# Patient Record
(demographics unavailable — no encounter records)

---

## 2017-03-20 NOTE — EMERGENCY ROOM VISIT NOTE
History


First contact with patient:  14:02


Chief Complaint:  BACK PAIN


Stated Complaint:  SEVERE BACK PAIN





History of Present Illness


The patient is a 18 year old female who presents to the Emergency Room with 

complaints of back pain and fever.  The patient reports that she has had low 

back pain times one week which has worsened this morning.  The patient 

initially had burning with urination last week and the patient's mother reports 

that she called the pediatrician, who called the patient in a prescription for 

Diflucan.  The patient states that her urinary symptoms seemed to resolve, but 

a few days later she developed pain in her back and her left side.  She now 

reports the pain is in her mid back and is worse on the left side, but is on 

both sides.  She had one episode of vomiting this morning.  The patient reports 

that she was seen at [facility] yesterday and told that she had a urinary 

tract infection and a possible tonsil infection.  She was given a prescription 

for Augmentin and has taken one dose.  She states that she has been taking 

ibuprofen with some relief of her symptoms.  She rates her overall discomfort a 

10/10.  The patient reports that she has had fevers and chills.  She denies any 

urinary symptoms at this time.  She denies any changes in bowel movements, 

vaginal discharge, or abnormal vaginal bleeding.  She denies chance of 

pregnancy.





Review of Systems


A complete 10-point Review of Systems was discussed with the patient, with 

pertinent positives and negatives listed in the History of Present Illness. All 

remaining Review of Systems questions can be considered negative unless 

otherwise specified.





Past Medical/Surgical History


Medical Problems:


(1) No Known Active Medical Problems








Social History


Smoking Status:  Never Smoker


Housing Status:  lives with family


Occupation Status:  student





Current/Historical Medications


Scheduled


Birth Control Pills (Birth Control Pills), 1 TAB PO DAILY


Ciprofloxacin Hcl (Cipro), 500 MG PO BID





Allergies


Coded Allergies:  


     No Known Allergies (Unverified , 1/24/14)





Physical Exam


Vital Signs











  Date Time  Temp Pulse Resp B/P Pulse Ox O2 Delivery O2 Flow Rate FiO2


 


3/20/17 17:52 37.0 110  94/52 98 Room Air  


 


3/20/17 17:13 37.4 116  117/67 99 Room Air  


 


3/20/17 15:49 37.9 108  108/68 99   


 


3/20/17 14:37  109      


 


3/20/17 14:33 37.9 112 16 118/67 100 Room Air  


 


3/20/17 12:48 37.6 137 24 138/85 98 Room Air  











Physical Exam


VITALS: Vitals are noted on the nurse's note and reviewed by myself.  Vital 

signs stable.  Temperature 37.6C.


GENERAL: This is an 18-year-old female, teary-eyed and appears to be in pain, 

nondiaphoretic, well-developed well-nourished.


SKIN: Capillary reflex less than 2 seconds.


HEENT: Normocephalic.  PERRLA.  EOMI.  Nares patent.  Mucous membranes moist.  

Bilateral tonsils slightly enlarged without significant erythema.  Neck is 

supple without nuchal rigidity.


HEART: Tachycardic, regular rhythm without murmurs gallops or rubs.


LUNGS: Clear to auscultation bilaterally without wheezes, rales or rhonchi.  


ABDOMEN: Positive bowel sounds x 4.  Soft, mild tenderness of the left lower 

quadrant.  No guarding or rebound tenderness.


MUSCULOSKELETAL: Left CVA tenderness, mild right CVA tenderness.


NEURO: Patient was alert and oriented to person place and time.





Medical Decision & Procedures


Laboratory Results


3/20/17 14:05








Red Blood Count 4.19, Mean Corpuscular Volume 84.5, Mean Corpuscular Hemoglobin 

28.6, Mean Corpuscular Hemoglobin Concent 33.9, Mean Platelet Volume 12.8, 

Neutrophils (%) (Auto) 74.8, Lymphocytes (%) (Auto) 14.2, Monocytes (%) (Auto) 

10.4, Eosinophils (%) (Auto) 0.1, Basophils (%) (Auto) 0.1, Neutrophils # (Auto

) 10.31, Lymphocytes # (Auto) 1.96, Monocytes # (Auto) 1.44, Eosinophils # (Auto

) 0.01, Basophils # (Auto) 0.02





3/20/17 14:05

















Test


  3/20/17


14:05 3/20/17


15:48


 


White Blood Count


  13.79 K/uL


(4.8-10.8) 


 


 


Red Blood Count


  4.19 M/uL


(4.2-5.4) 


 


 


Hemoglobin


  12.0 g/dL


(12.0-16.0) 


 


 


Hematocrit 35.4 % (37-47)  


 


Mean Corpuscular Volume


  84.5 fL


() 


 


 


Mean Corpuscular Hemoglobin


  28.6 pg


(25-34) 


 


 


Mean Corpuscular Hemoglobin


Concent 33.9 g/dl


(32-36) 


 


 


Platelet Count


  128 K/uL


(130-400) 


 


 


Mean Platelet Volume


  12.8 fL


(7.4-10.4) 


 


 


Neutrophils (%) (Auto) 74.8 %  


 


Lymphocytes (%) (Auto) 14.2 %  


 


Monocytes (%) (Auto) 10.4 %  


 


Eosinophils (%) (Auto) 0.1 %  


 


Basophils (%) (Auto) 0.1 %  


 


Neutrophils # (Auto)


  10.31 K/uL


(1.4-6.5) 


 


 


Lymphocytes # (Auto)


  1.96 K/uL


(1.2-3.4) 


 


 


Monocytes # (Auto)


  1.44 K/uL


(0.11-0.59) 


 


 


Eosinophils # (Auto)


  0.01 K/uL


(0-0.5) 


 


 


Basophils # (Auto)


  0.02 K/uL


(0-0.2) 


 


 


RDW Standard Deviation


  40.4 fL


(36.4-46.3) 


 


 


RDW Coefficient of Variation


  13.2 %


(11.5-14.5) 


 


 


Immature Granulocyte % (Auto) 0.4 %  


 


Immature Granulocyte # (Auto)


  0.05 K/uL


(0.00-0.02) 


 


 


Platelet Estimate NORMAL  


 


Anion Gap


  12.0 mmol/L


(3-11) 


 


 


Est Creatinine Clear Calc


Drug Dose 111.6 ml/min 


  


 


 


Estimated GFR (


American) 126.7 


  


 


 


Estimated GFR (Non-


American 109.3 


  


 


 


BUN/Creatinine Ratio 9.5 (10-20)  


 


Calcium Level


  9.6 mg/dl


(8.5-10.1) 


 


 


Total Bilirubin


  0.3 mg/dl


(0.2-1) 


 


 


Aspartate Amino Transf


(AST/SGOT) 13 U/L (15-37) 


  


 


 


Alanine Aminotransferase


(ALT/SGPT) 18 U/L (12-78) 


  


 


 


Alkaline Phosphatase


  67 U/L


() 


 


 


Total Protein


  8.4 gm/dl


(6.4-8.2) 


 


 


Albumin


  3.5 gm/dl


(3.4-5.0) 


 


 


Globulin


  4.9 gm/dl


(2.5-4.0) 


 


 


Albumin/Globulin Ratio 0.7 (0.9-2)  


 


Lipase


  75 U/L


() 


 


 


Urine Color  YELLOW 


 


Urine Appearance  CLEAR (CLEAR) 


 


Urine pH  8.5 (4.5-7.5) 


 


Urine Specific Gravity


  


  1.018


(1.000-1.030)


 


Urine Protein  TRACE (NEG) 


 


Urine Glucose (UA)  NEG (NEG) 


 


Urine Ketones  1+ (NEG) 


 


Urine Occult Blood  NEG (NEG) 


 


Urine Nitrite  NEG (NEG) 


 


Urine Bilirubin  NEG (NEG) 


 


Urine Urobilinogen  NEG (NEG) 


 


Urine Leukocyte Esterase  SMALL (NEG) 


 


Urine WBC (Auto)


  


  10-30 /hpf


(0-5)


 


Urine RBC (Auto)


  


  5-10 /hpf


(0-4)


 


Urine Hyaline Casts (Auto)


  


  5-10 /lpf


(0-5)


 


Urine Epithelial Cells (Auto)  >30 /lpf (0-5) 


 


Urine Bacteria (Auto)  NEG (NEG) 


 


Urine Renal Epithelial Cells


  


  5-10 /lpf


(0-5)


 


Urine Pregnancy Test  NEG (NEG) 











Medications Administered











 Medications


  (Trade)  Dose


 Ordered  Sig/Tyree


 Route  Start Time


 Stop Time Status Last Admin


Dose Admin


 


 Sodium Chloride


  (Nss 1000ml)  1,000 ml @ 


 999 mls/hr  Q1H1M STAT


 IV  3/20/17 14:14


 3/20/17 15:14 DC 3/20/17 14:30


999 MLS/HR


 


 Ketorolac


 Tromethamine 30 mg  30 mg  STK-MED ONCE


 .ROUTE  3/20/17 14:23


 3/20/17 14:26 DC 3/20/17 14:29


15 MG


 


 Sodium Chloride


  (Nss 1000ml)  1,000 ml @ 


 999 mls/hr  Q1H1M STAT


 IV  3/20/17 15:24


 3/20/17 16:24 DC 3/20/17 15:24


999 MLS/HR


 


 Acetaminophen


  (Tylenol Tab)  1,000 mg  NOW  STAT


 PO  3/20/17 15:56


 3/20/17 15:57 DC 3/20/17 15:56


1,000 MG


 


 Ceftriaxone Sodium


  (Rocephin Inj)  1 gm  NOW  STAT


 IV  3/20/17 16:52


 3/20/17 16:54 DC 3/20/17 17:12


1 GM











Medical Decision


Differential diagnosis includes pyelonephritis, renal calculus, ovarian cyst, 

ovarian torsion, appendicitis, cholecystitis, pancreatitis, bowel obstruction, 

colitis, musculoskeletal pain, among others.





The patient was evaluated as above.  Labs were drawn and IV access was 

obtained.  Imaging studies were performed and read by radiology as above.  The 

patient was medicated as above.  The patient was reassessed multiple times 

during their stay in the emergency department and remained in stable condition.





The patient is an 18-year-old female who presents today complaining of back 

pain and fevers.  The patient recently started treatment for urinary tract 

infection.  Exam did reveal some CVA tenderness and the patient does have a low-

grade fever today.  Labs revealed a leukocytosis of 13,000.  There were no 

concerning electrolyte abnormalities.  Urine pregnancy was negative.  

Urinalysis was suggestive of infection and given the patient's symptoms I feel 

this likely represents a pyelonephritis.  I did speak with the ED pharmacist, 

who recommended switching the patient to ciprofloxacin.  She was given a dose 

of Rocephin and will be placed on a course of Cipro at home.  The patient's 

pain was well controlled with Toradol.  She was initially tachycardic, but her 

heart rate did improve after 2 L of fluids.  The patient was instructed that 

she should have follow-up within 48 hours with her primary care provider.





Based on the patient's presentation, lab results, and imaging studies, I feel 

the patient is stable for outpatient treatment.  The patient's case was 

reviewed with Dr. Quiroz, ED attending physician, who agreed with my 

assessment and treatment plan. Discharge instructions were reviewed with the 

patient.  The patient verbalized understanding of my assessment and treatment 

plan and was discharged home in good condition.





Impression





 Primary Impression:  


 Pyelonephritis





Departure Information


Dispostion


Home / Self-Care





Condition


GOOD





Prescriptions





Ciprofloxacin Hcl (CIPRO) 500 Mg Tab


500 MG PO BID for 12 Days, #24 TAB


   Prov: Alana Joens .BELLE         3/20/17





Referrals


Zofia Dumont M.D. (PCP)





Patient Instructions


My [facility]





Additional Instructions





You were prescribed ciprofloxacin to be taken twice daily for 12 days. This is 

an antibiotic. All antibiotics have the potential to cause diarrhea. Stop this 

medication and contact a medical provider if you were to develop any 

significant adverse side effects including: wheezing, shortness of breath, 

passing out, vomiting, or a diffuse rash. Always take antibiotics as directed 

and COMPLETE the ENTIRE course regardless of the improvement of your symptoms.





For pain/fever control, you can use the following over-the-counter medicines (

if >11 yo):





- Regular strength (325mg/tab) Tylenol (acetaminophen) 2 tabs every 4-6 hours 

as needed. Do not exceed 12 tablets in a 24 hour period. Avoid taking more than 

4 grams (4000 mg) of Tylenol per day. This includes any other sources of 

acetaminophen you may take on a regular basis.





- Regular strength (200 mg/tab) Advil (ibuprofen) 3-4 tabs every 6 hours as 

needed. Do not exceed a dose of 3200 mg per day.





You should have a follow-up with your pediatrician within 48 hours.





Return to the emergency department with abdominal pain, persistent vomiting, 

fevers not controlled with Tylenol or ibuprofen, worsening back pain or any 

other new/concerning symptoms.

## 2017-12-20 NOTE — EMERGENCY ROOM VISIT NOTE
History


First contact with patient:  13:50


Chief Complaint:  URINARY SYMPTOMS


Stated Complaint:  UTI, HERPS, PAIN IN CARROL, BUTT


Nursing Triage Summary:  


Recent dx of BV, UTI, and Herpes. Severe vaginal pain/itching.





History of Present Illness


The patient is a 19 year old female who presents to the Emergency Room with 

complaints of vaginal discharge and genital pain that has gotten progressively 

worse over the last week.  The patient was seen by her OB/GYN doctor last week.

  She was diagnosed with a bacterial vaginal infection.  She was put on vaginal 

suppositories.  She is unsure of the medication.  She has not seen any relief.  

She reports a white, thick discharge.  She also reports severe pain in the 

vaginal and anal area.  The patient has been sexually active with 2 partners in 

the last 2 weeks.  She is not using protection.  She also reports dysuria.  No 

flank pain.  No nausea or vomiting.  No recent fever.  The patient was seen at 

[facility] 2 days ago.  She was diagnosed with herpes and a UTI.  She was put 

on Valtrex and an oral antibiotic.  She has had minimal relief.





Review of Systems


10 system review performed and  negative unless noted in HPI or below





Past Medical/Surgical History


Medical Problems:


(1) No Known Active Medical Problems








Social History


Smoking Status:  Never Smoker


Housing Status:  lives with family


Occupation Status:  student





Current/Historical Medications


Scheduled


Metronidazole (Flagyl), 500 MG PO BID


Nitrofurantoin Monohyd Macro (Macrobid), 100 MG PO BID





Physical Exam


Vital Signs











  Date Time  Temp Pulse Resp B/P (MAP) Pulse Ox O2 Delivery O2 Flow Rate FiO2


 


12/20/17 15:45  62 16 109/64 98 Room Air  


 


12/20/17 13:41 36.7 108 22 137/82 98 Room Air  











Physical Exam


VITALS: Vitals are noted on the nurse's note and reviewed by myself.  Vital 

signs stable.


GENERAL: 19-year-old female, tearful,, in no acute distress, nondiaphoretic, 

well-developed well-nourished.


SKIN: The skin was without rashes, erythema, edema, or bruising.  


HEAD: Normocephalic atraumatic.  


NECK: Supple without nuchal rigidity.  


HEART: Regular rate and rhythm without murmurs gallops or rubs.


LUNGS: Clear to auscultation bilaterally without wheezes, rales or rhonchi.   

No accessory muscle use.


ABDOMEN: Positive bowel sounds x 4.Soft, nontender, without organomegaly. No 

guarding or rebound tenderness.


: Approximate 2 mm ulcerations noted to the labia majora and perianal area.  

Thick, white discharge noted.  Pelvic exam is limited secondary to pain.  The 

cervix was not visualized.  Vaginal rugae friable, erythematous.  No foul odor 

noted.


MUSCULOSKELETAL: No muscle atrophy, erythema, or edema noted.  Full range of 

motion in all extremities.  No tenderness to palpation.  Normal gait.  Strength 

5/5 throughout.


NEURO: Patient was alert and oriented to person place and time.  Normal 

sensation to touch. No focal neurological deficits.





Medical Decision & Procedures


Laboratory Results











Test


  12/20/17


14:50 12/20/17


15:00


 


Urine Color  DK YELLOW 


 


Urine Appearance  CLOUDY (CLEAR) 


 


Urine pH  7.5 (4.5-7.5) 


 


Urine Specific Gravity


  


  1.022


(1.000-1.030)


 


Urine Protein  NEG (NEG) 


 


Urine Glucose (UA)  NEG (NEG) 


 


Urine Ketones  TRACE (NEG) 


 


Urine Occult Blood  2+ (NEG) 


 


Urine Nitrite  NEG (NEG) 


 


Urine Bilirubin  NEG (NEG) 


 


Urine Urobilinogen  NEG (NEG) 


 


Urine Leukocyte Esterase  MODERATE (NEG) 


 


Urine WBC (Auto)


  


  10-30 /hpf


(0-5)


 


Urine RBC (Auto)  >30 /hpf (0-4) 


 


Urine Hyaline Casts (Auto)


  


  5-10 /lpf


(0-5)


 


Urine Epithelial Cells (Auto)


  


  20-30 /lpf


(0-5)


 


Urine Bacteria (Auto)  NEG (NEG) 


 


Urine Pregnancy Test  NEG (NEG) 














 Date/Time


Source Procedure


Growth Status


 


 


 12/20/17 14:50


Cervix Swab Trichomonas Preparation - Final Complete











Medications Administered











 Medications


  (Trade)  Dose


 Ordered  Sig/Tyree


 Route  Start Time


 Stop Time Status Last Admin


Dose Admin


 


 Acyclovir


  (Zovirax Tab)  400 mg  ONE  STAT


 PO  12/20/17 15:17


 12/20/17 15:18 DC 12/20/17 15:48


400 MG


 


 Metronidazole


  (Flagyl Tab)  500 mg  NOW  STAT


 PO  12/20/17 15:28


 12/20/17 15:29 DC 12/20/17 15:57


500 MG


 


 Acyclovir


  (Zovirax Tab)  400 mg  ONE  STAT


 PO  12/20/17 15:42


 12/20/17 15:43 DC 12/20/17 15:57


400 MG











ED Course


The patient was seen and examined


She was ordered lidocaine jelly to the genital area


Upon reevaluation, the patient was feeling slightly better.  We discussed the 

results of her exam.  She voiced understanding.


The patient was given 1 dose of Flagyl 500 mg.  She was also given 1 dose of 

acyclovir 400 mg by mouth.  She was also given a home pack of 1 dose of 

acyclovir for tonight.


Discharge instructions were reviewed, and she was discharged in good condition





Medical Decision


Differential diagnosis: STI, viral versus bacterial, vaginal candidiasis, UTI, 

PID





This patient is a 19-year-old female that presents emergency department with a 

main complaint of vaginal discharge and pain in the genital area.  On exam, she 

does have ulcerations consistent with likely genital herpes.  She also appears 

to have bacterial vaginosis.  She is nontoxic in appearance.  She is afebrile.  

Her abdomen was benign on exam.  I do not suspect PID.  Her urine was 

consistent with likely UTI.  The patient has a prescription for acyclovir that 

she will  tomorrow morning.  This should be adequate treatment for the 

HSV.  She was given 1 dose here,  in addition to a home pack.  She was 

instructed to continue her antibiotic as prescribed for a UTI.  A urine culture 

was sent.  We also added Flagyl as the patient appears to have bacterial 

vaginosis.  She was instructed to follow-up with her OB/GYN doctor early next 

week at the latest.  She was given lidocaine jelly to apply 3 times daily to 

the affected area for pain relief.  She is in agreement with this plan.  She 

also agrees to return to the emergency department with any new, worsening or 

concerning symptoms.





Medication Reconcilliation


Current Medication List:  was personally reviewed by me





Blood Pressure Screening


Patient's blood pressure:  Elevated blood pressure


Blood pressure disposition:  Elevated BP felt to be situational





Impression





 Primary Impression:  


 Herpes genitalis


 Additional Impression:  


 UTI (urinary tract infection)





Departure Information


Dispostion


Home / Self-Care





Condition


GOOD





Prescriptions





Metronidazole (Flagyl) 500 Mg Tab


500 MG PO BID for Pain for 7 Days, #14 TAB


   For Initial Treatment


   Prov: Marisa Schmidt PA-C         12/20/17





Referrals


No Doctor, Assigned (PCP)





Patient Instructions


Herpes, My [facility]





Additional Instructions





You were evaluated in the emergency department for vaginal discharge and pain 

in the genital area.  It does appear that you likely have herpes, which is a 

viral infection.





Please take acyclovir one dose tonight before bed.  This has been provided for 

you in the emergency department.  Please then pickup your prescription tomorrow 

morning for acyclovir and take as directed.





Apply lidocaine jelly-1 thin layer to the affected area no more than 3 times 

daily for pain





Please continue Macrobid as prescribed for UTI





Please begin Flagyl (metronidazole) 1 tab twice daily for 7 days.  This is for 

a likely bacterial infection in the vagina.





Please follow-up with your OB/GYN doctor in the next 3-5 days.





No sexual activity while you are still having symptoms.  Please use barrier 

protection with condoms at all times.  Please also notify your sexual partners 

of the diagnosis.





Return to the emergency department with any new, worsening or concerning 

symptoms.





Problem Qualifiers

## 2017-12-21 NOTE — EMERGENCY ROOM VISIT NOTE
History


Report prepared by Xochitl:  Prerna Theodore


Under the Supervision of:  Dr. Cali Culver M.D.


First contact with patient:  06:39


Chief Complaint:  URINARY SYMPTOMS


Stated Complaint:  CAN'T PEE,CAN'T POOP,PAIN





History of Present Illness


The patient is a 19 year old white female with no past medical history who 

presents to the ED with a cc of persistent urinary symptoms beginning a week 

and a half ago. Positive vaginal odor, vaginal itching, vaginal burning, pain 

with urination, constipation. Negative nausea, vomiting.  She currently rates 

her discomfort as an 8/10 in severity. The patient states that a week and a 

half ago she was seen by her OB/Gyn for a vaginal odor she noticed.  She states 

that she was given a vaginal antibiotic, but denies any relief of her symptoms.

  The patient states that she was evaluated at [facility] yesterday and was 

diagnosed with herpes, a UTI, and a yeast infection.  She states that she has 

been given an antibiotic for a UTI, but is awaiting medication for the herpes.  

The patient states that she has tried a numbing cream without relief of her 

symptoms.  She states that her LNMP was approximately last month.





   Source of History:  patient


   Onset:  a week and a half ago


   Position:  other (global)


   Symptom Intensity:  8/10


   Quality:  other (urinary symptoms)


   Timing:  other (persistent)


   Associated Symptoms:  No nausea, No vomiting


Note:


Associated Symptoms: vaginal odor, itching, burning, constipation





Review of Systems


See HPI for pertinent positives and negatives.  A total of ten systems were 

reviewed and were otherwise negative.





Past Medical & Surgical


Medical Problems:


(1) No Known Active Medical Problems








Family History





Patient reports no known family medical history.





Social History


Smoking Status:  Never Smoker


Marital Status:  single


Housing Status:  lives with family


Occupation Status:  student





Current/Historical Medications


Scheduled


Cephalexin (Keflex), 1 CAP PO BID


Metronidazole (Flagyl), 500 MG PO BID


Nitrofurantoin Monohyd Macro (Macrobid), 100 MG PO BID





Allergies


Coded Allergies:  


     No Known Allergies (Unverified , 1/24/14)





Physical Exam


Vital Signs











  Date Time  Temp Pulse Resp B/P (MAP) Pulse Ox O2 Delivery O2 Flow Rate FiO2


 


12/21/17 10:03  89 20 107/64 97   


 


12/21/17 08:24  74 20 101/59 95   


 


12/21/17 07:31  81      


 


12/21/17 06:34 36.7 90 18 111/77 98 Room Air  











Physical Exam


GENERAL: Awake, alert, well-appearing, NAD


HENT: Normocephalic, atraumatic.


EYES: Normal conjunctiva. Sclera non-icteric.


NECK: Supple. No nuchal rigidity. FROM.


RESPIRATORY: CTAB, no rhonchi, wheezing, crackles


CARDIAC: RRR, no MRG


ABDOMEN: Soft, NTND, BS+, No CVA tenderness.


MSK: No chest wall TTP, no LE edema


NEURO: GCS 15, CN 2-12 intact, moves all 4s on command


SKIN: No rash or jaundice noted.





Medical Decision & Procedures


ER Provider


Diagnostic Interpretation:


X-ray: Per my interpretation, radiologist review. 





ABDOMEN 2VIEW W/PA CHEST RTN





HISTORY:  19 years-old Female ABDOMINAL PAIN/GI acute generalized abdominal pain





COMPARISON: CT 1/24/2014





TECHNIQUE: PA view of the chest with erect and supine views of the abdomen





FINDINGS: 


Cardiomediastinal and hilar silhouettes are within normal limits. There is no


pneumothorax, pleural effusion, focal airspace consolidation or overt pulmonary


edema. Prominent skin folds are noted overlying the left greater than right


chest wall. Bones of the chest are grossly intact.





No pneumoperitoneum on the upright projection. The bowel gas pattern is


nonobstructive. No organomegaly or urolith identified.





IMPRESSION: 


1. No acute cardiopulmonary process.


2. Nonobstructive bowel gas pattern without pneumoperitoneum. 





The above report was generated using voice recognition software. It may contain


grammatical, syntax or spelling errors.





Electronically signed by:  Jameson Hernandez M.D.


12/21/2017 7:53 AM





Dictated Date/Time:  12/21/2017 7:51 AM





Laboratory Results


12/21/17 07:18








Red Blood Count 4.48, Mean Corpuscular Volume 87.5, Mean Corpuscular Hemoglobin 

29.5, Mean Corpuscular Hemoglobin Concent 33.7





12/21/17 07:18

















Test


  12/21/17


07:10 12/21/17


07:18


 


Urine Color DK YELLOW  


 


Urine Appearance CLOUDY (CLEAR)  


 


Urine pH 5.0 (4.5-7.5)  


 


Urine Specific Gravity


  1.031


(1.000-1.030) 


 


 


Urine Protein TRACE (NEG)  


 


Urine Glucose (UA) NEG (NEG)  


 


Urine Ketones 1+ (NEG)  


 


Urine Occult Blood TRACE (NEG)  


 


Urine Nitrite POS (NEG)  


 


Urine Bilirubin NEG (NEG)  


 


Urine Urobilinogen NEG (NEG)  


 


Urine Leukocyte Esterase SMALL (NEG)  


 


Urine WBC (Auto) >30 /hpf (0-5)  


 


Urine RBC (Auto) 0-4 /hpf (0-4)  


 


Urine Hyaline Casts (Auto) 1-5 /lpf (0-5)  


 


Urine Epithelial Cells (Auto) >30 /lpf (0-5)  


 


Urine Bacteria (Auto) NEG (NEG)  


 


Urine Renal Epithelial Cells  /lpf (0-5)  


 


Urine Pathogenic Casts  /lpf (0)  


 


Urine Mucus


  PRESENT (NONE


PRSENT) 


 


 


Urine Pregnancy Test NEG (NEG)  


 


White Blood Count


  


  7.76 K/uL


(4.8-10.8)


 


Red Blood Count


  


  4.48 M/uL


(4.2-5.4)


 


Hemoglobin


  


  13.2 g/dL


(12.0-16.0)


 


Hematocrit  39.2 % (37-47) 


 


Mean Corpuscular Volume


  


  87.5 fL


()


 


Mean Corpuscular Hemoglobin


  


  29.5 pg


(25-34)


 


Mean Corpuscular Hemoglobin


Concent 


  33.7 g/dl


(32-36)


 


Platelet Count


  


  235 K/uL


(130-400)


 


RDW Standard Deviation


  


  43.9 fL


(36.4-46.3)


 


RDW Coefficient of Variation


  


  13.6 %


(11.5-14.5)


 


Neutrophils % (Manual)  64.9 % 


 


Lymphocytes % (Manual)  18.4 % 


 


Variant Lymphocytes % (manual)  11.4 % 


 


Monocytes % (Manual)  4.4 % 


 


Eosinophils % (Manual)  0.9 % 


 


Anion Gap


  


  7.0 mmol/L


(3-11)


 


Est Creatinine Clear Calc


Drug Dose 


  116.7 ml/min 


 


 


Estimated GFR (


American) 


  147.7 


 


 


Estimated GFR (Non-


American 


  127.4 


 


 


BUN/Creatinine Ratio  21.1 (10-20) 


 


Calcium Level


  


  9.0 mg/dl


(8.5-10.1)


 


Total Bilirubin


  


  0.2 mg/dl


(0.2-1)


 


Direct Bilirubin


  


  < 0.1 mg/dl


(0-0.2)


 


Aspartate Amino Transf


(AST/SGOT) 


  14 U/L (15-37) 


 


 


Alanine Aminotransferase


(ALT/SGPT) 


  18 U/L (12-78) 


 


 


Alkaline Phosphatase


  


  67 U/L


()


 


Total Protein


  


  8.5 gm/dl


(6.4-8.2)


 


Albumin


  


  3.9 gm/dl


(3.4-5.0)


 


Lipase


  


  101 U/L


()








Laboratory results reviewed by me





Medications Administered











 Medications


  (Trade)  Dose


 Ordered  Sig/Tyree


 Route  Start Time


 Stop Time Status Last Admin


Dose Admin


 


 Acetaminophen


  (Tylenol Tab)  1,000 mg  NOW  STAT


 PO  12/21/17 06:57


 12/21/17 06:59 DC 12/21/17 07:21


1,000 MG


 


 Metoclopramide HCl


  (Reglan Inj)  10 mg  NOW  STAT


 IV  12/21/17 06:57


 12/21/17 06:59 DC 12/21/17 07:22


10 MG


 


 Lactulose


  (Chronulac Syrup)  30 gm  NOW  STAT


 PO  12/21/17 06:57


 12/21/17 06:59 DC 12/21/17 07:23


30 GM


 


 Ceftriaxone Sodium


  (Rocephin Inj)  1 gm  NOW  STAT


 IV  12/21/17 08:44


 12/21/17 08:45 DC 12/21/17 09:05


1 GM











ED Course


0652: The patient was evaluated in room B3B. A complete history and physical 

exam was performed.





0935: I reevaluated the patient and she is resting comfortably.  I discussed 

the test results with her and I discussed the treatment plan.  She verbalized 

complete understanding and agreement.  She is ready to go home.





Medical Decision


Differential diagnosis:


Etiologies such as appendicitis, diverticulitis, PUD, biliary pathology, UTI, 

pancreatitis, obstruction, mesenteric ischemia, aortic pathology, infections, 

inflammatory bowel disease, renal colic, as well as others were entertained.





The patient is a 19 year old white female with no past medical history who 

presents to the ED with a cc of persistent urinary symptoms beginning a week 

and a half ago.





Patient was seen and evaluated at the bedside.  Patient states that she's had 

some urinary type symptoms along with some vaginal itching.  Patient states she 

initially received an intravaginal medication was started on some Macrobid.  

Patient states she has had some persistent urinary symptoms.  Patient was also 

recent [facility] and [facility] with [facility] this was to begin this 

medications today.  Patient also complains of some constipation and states that 

she is mildly obstipated if she has not passed gas and a day or so.  Patient 

exam is very well-appearing does not have any abnormal vital signs and has a 

very soft abdomen with bowel sounds present.  Patient blood work completed 

along with urinalysis, UPT, and plain films.  Patient's plain films were 

negative.  Patient may have had evidence of urinary tract infection.  Patient 

did have a normal white blood cell count LFTs and lipase.  Patient states she 

was feeling better but refused an enema.  Patient states she was able to pass 

gas.  Less likely obstipation recent constipation.  She was supposed to get her 

scripts for likely herpes.  She had reviewing the chart she was seen here 

yesterday and does have a vaginal exam completed and cultures were sent for 

possible STI.  Patient was given a different prescription for possible UTI 

patient was deemed suitable for outpatient follow-up and treatment. Patient was 

given strict follow-up, discharge, and return precautions.  All questions were 

answered.  Patient was deemed suitable for outpatient follow-up at this time.  

Patient agreed with the plan of care and was safely discharged home.





Medication Reconcilliation


Current Medication List:  was personally reviewed by me





Impression





 Primary Impression:  


 Symptoms involving urinary system


 Additional Impressions:  


 UTI (urinary tract infection)


 Constipation





Scribe Attestation


The scribe's documentation has been prepared under my direction and personally 

reviewed by me in its entirety. I confirm that the note above accurately 

reflects all work, treatment, procedures, and medical decision making performed 

by me.





Departure Information


Dispostion


Home / Self-Care





Prescriptions





Cephalexin (KEFLEX) 500 Mg Cap


1 CAP PO BID for 7 Days, #14 CAP


   Prov: Cali Culver M.D.         12/21/17





Referrals


No Doctor, Assigned (PCP)





Forms


HOME CARE DOCUMENTATION FORM,                                                 

               IMPORTANT VISIT INFORMATION





Patient Instructions


Constipation, Diet High Fiber, ED UTI Cystitis Female, [facility]





Additional Instructions





Please return to the emergency department if you have worsening or recurrent 

symptoms not amenable to at-home treatment.  Please call for a follow-up 

appointment with her primary care physician.  Please take your medications as 

prescribed.  If you have other concerns and/or complaints please feel free to 

also call your primary care physician's office or return the ED for further 

evaluation, management, and treatment.





Consider increasing fiber as well as fruits in her diet.  Consider increasing 

the feet greens as well as fluids.  You may also consider docusate and senna in 

addition to lactulose.  If you're still having difficulty with bowel movements 

you may try an enema.





You may take 600 mg Ibuprofen every 6 hours as needed for pain with food for no 

more than 2 consecutive days. You may take tylenol 1000 mg every 6 hours as 

needed for pain. You may take motrin and tylenol separately or at the same 

time. 





Stop taking your current antibiotic and you may resume the new one. 





You have been examined and treated today on an emergency basis only. This is 

not a substitute for, or an effort to provide, complete comprehensive medical 

care. It is impossible to recognize and treat all injuries or illnesses in a 

single emergency department visit. It is therefore important that you follow up 

closely with [facility], your PCP, and/or your specialist(s). 

Call as soon as possible for an appointment.





Thank you for your time and consideration. I look forward to speaking with you 

again soon. Please don't hesitate to call us if you have any questions.





Problem Qualifiers








 Additional Impressions:  


 UTI (urinary tract infection)


 Urinary tract infection type:  acute cystitis  Hematuria presence:  without 

hematuria  Qualified Codes:  N30.00 - Acute cystitis without hematuria


 Constipation


 Constipation type:  unspecified constipation type  Qualified Codes:  K59.00 - 

Constipation, unspecified

## 2017-12-21 NOTE — DIAGNOSTIC IMAGING REPORT
ABDOMEN 2VIEW W/PA CHEST RTN



HISTORY:  19 years-old Female ABDOMINAL PAIN/GI acute generalized abdominal pain



COMPARISON: CT 1/24/2014



TECHNIQUE: PA view of the chest with erect and supine views of the abdomen



FINDINGS: 

Cardiomediastinal and hilar silhouettes are within normal limits. There is no

pneumothorax, pleural effusion, focal airspace consolidation or overt pulmonary

edema. Prominent skin folds are noted overlying the left greater than right

chest wall. Bones of the chest are grossly intact.



No pneumoperitoneum on the upright projection. The bowel gas pattern is

nonobstructive. No organomegaly or urolith identified.



IMPRESSION: 

1. No acute cardiopulmonary process.

2. Nonobstructive bowel gas pattern without pneumoperitoneum. 







The above report was generated using voice recognition software. It may contain

grammatical, syntax or spelling errors.







Electronically signed by:  Jameson Hernandez M.D.

12/21/2017 7:53 AM



Dictated Date/Time:  12/21/2017 7:51 AM